# Patient Record
Sex: MALE | Race: BLACK OR AFRICAN AMERICAN | Employment: FULL TIME | URBAN - METROPOLITAN AREA
[De-identification: names, ages, dates, MRNs, and addresses within clinical notes are randomized per-mention and may not be internally consistent; named-entity substitution may affect disease eponyms.]

---

## 2018-07-23 ENCOUNTER — OFFICE VISIT (OUTPATIENT)
Dept: ORTHOPEDIC SURGERY | Age: 63
End: 2018-07-23

## 2018-07-23 VITALS
SYSTOLIC BLOOD PRESSURE: 170 MMHG | DIASTOLIC BLOOD PRESSURE: 103 MMHG | RESPIRATION RATE: 16 BRPM | HEIGHT: 66 IN | OXYGEN SATURATION: 99 % | WEIGHT: 191 LBS | TEMPERATURE: 97.4 F | HEART RATE: 114 BPM | BODY MASS INDEX: 30.7 KG/M2

## 2018-07-23 DIAGNOSIS — M19.079 ARTHRITIS OF METATARSOPHALANGEAL (MTP) JOINT OF GREAT TOE: ICD-10-CM

## 2018-07-23 DIAGNOSIS — M79.672 LEFT FOOT PAIN: ICD-10-CM

## 2018-07-23 DIAGNOSIS — M19.079 ARTHRITIS OF METATARSOPHALANGEAL (MTP) JOINT OF GREAT TOE: Primary | ICD-10-CM

## 2018-07-23 RX ORDER — FAMOTIDINE 40 MG/1
40 TABLET, FILM COATED ORAL DAILY
Qty: 30 TAB | Refills: 0 | Status: SHIPPED | OUTPATIENT
Start: 2018-07-23 | End: 2021-08-20

## 2018-07-23 RX ORDER — MELOXICAM 15 MG/1
15 TABLET ORAL
Qty: 30 TAB | Refills: 0 | Status: SHIPPED | OUTPATIENT
Start: 2018-07-23 | End: 2018-08-15

## 2018-07-23 NOTE — PATIENT INSTRUCTIONS
Please follow up in 4 weeks. You are advised to contact us if your condition worsens. Encourage Supportive Shoes: HOKA ONE, MBT, or Sketchers with Rockerbottom. Running Etc: Amari, [address] [facility] or [employer] Search Cartiva implants for information. The provider has ordered a custom brace/orthotic for you. This will be customized and made for you by an outside facility. Please contact the orthotist at one of the below offices for your custom fitting and follow up in the office with the doctor or his physicians assistant 3 weeks after you have been wearing the brace. Kaylah Bob at [facility]:  
 
[address] [place] [address] [facility] Phone: [phone] Or  
 
Perfecto Srinivasan 41. Renetta [address] [place] [address] Phone: [phone] Foot Pain: Care Instructions Your Care Instructions Foot injuries that cause pain and swelling are fairly common. Almost all sports or home repair projects can cause a misstep that ends up as foot pain. Normal wear and tear, especially as you get older, also can cause foot pain. Most minor foot injuries will heal on their own, and home treatment is usually all you need to do. If you have a severe injury, you may need tests and treatment. Follow-up care is a key part of your treatment and safety. Be sure to make and go to all appointments, and call your doctor if you are having problems. It's also a good idea to know your test results and keep a list of the medicines you take. How can you care for yourself at home? · Take pain medicines exactly as directed. ¨ If the doctor gave you a prescription medicine for pain, take it as prescribed. ¨ If you are not taking a prescription pain medicine, ask your doctor if you can take an over-the-counter medicine. · Rest and protect your foot. Take a break from any activity that may cause pain. · Put ice or a cold pack on your foot for 10 to 20 minutes at a time.  Put a thin cloth between the ice and your skin. · Prop up the sore foot on a pillow when you ice it or anytime you sit or lie down during the next 3 days. Try to keep it above the level of your heart. This will help reduce swelling. · Your doctor may recommend that you wrap your foot with an elastic bandage. Keep your foot wrapped for as long as your doctor advises. · If your doctor recommends crutches, use them as directed. · Wear roomy footwear. · As soon as pain and swelling end, begin gentle exercises of your foot. Your doctor can tell you which exercises will help. When should you call for help? Call 911 anytime you think you may need emergency care. For example, call if: 
  · Your foot turns pale, white, blue, or cold.  
 Call your doctor now or seek immediate medical care if: 
  · You cannot move or stand on your foot.  
  · Your foot looks twisted or out of its normal position.  
  · Your foot is not stable when you step down.  
  · You have signs of infection, such as: 
¨ Increased pain, swelling, warmth, or redness. ¨ Red streaks leading from the sore area. ¨ Pus draining from a place on your foot. ¨ A fever.  
  · Your foot is numb or tingly.  
 Watch closely for changes in your health, and be sure to contact your doctor if: 
  · You do not get better as expected.  
  · You have bruises from an injury that last longer than 2 weeks. Where can you learn more? Go to http://brianne-anamaria.info/. Enter R244 in the search box to learn more about \"Foot Pain: Care Instructions. \" Current as of: November 29, 2017 Content Version: 11.7 © 6280-7979 Backblaze. Care instructions adapted under license by [employer] (which disclaims liability or warranty for this information). If you have questions about a medical condition or this instruction, always ask your healthcare professional. [address] any warranty or liability for your use of this information.

## 2018-07-23 NOTE — LETTER
NOTIFICATION RETURN TO WORK / SCHOOL 
 
7/23/2018 11:27 AM 
 
Mr. Deng Velasquez [place] [address] To Whom It May Concern: 
 
Deng Velasquez is currently under the care of [address] [place] [address] Alf Stephensd. Please excuse Mr. Dominic Arias from [employer] falls duty. He is under my care for severe osteoarthritis that is painful while sitting and standing. If there are questions or concerns please have the patient contact our office.  
 
 
 
Sincerely, 
 
 
Leopoldo Jacobs, MD

## 2018-07-23 NOTE — PROGRESS NOTES
AMBULATORY PROGRESS NOTE      Patient: Keren Ahmadi             MRN: [id number]     SSN: [id number] Body mass index is 30.83 kg/(m^2). YOB: 1955     AGE: 66 y.o. EX: male    PCP: None    IMPRESSION/DIAGNOSIS AND TREATMENT PLAN     DIAGNOSES  1. Arthritis of metatarsophalangeal (MTP) joint of great toe    2. Left foot pain        Orders Placed This Encounter    AMB SUPPLY ORDER    [38271] Foot Min 3V    NICOTINE METABOLITE    meloxicam (MOBIC) 15 mg tablet    famotidine (PEPCID) 40 mg tablet      Eduar Dapdevorah oBny understands his diagnoses and the proposed plan. I had a lengthy discussion with him. He has severe OA, left great toe 1st MTP. Treatment options include left great toe 1st MTP arthrodesis, possible Cartiva implant but I am thinking more of a fusion. Additional plans are listed as below. Concern about his smoking, smokes at least 1 pack of cigarettes per day and am concerned about his nicotine levels. I explained that nicotine is a deleterious to bone healing. He understands this. He has poor motion, left great toe 1st MTP. Plan:    1) Encourage Supportive Shoes: HOKA ONE, MBT, or Sketchers with RockerConferensumom. Running Etc: [employer] or [employer], [employer]  2) Lab tests: Nicotine levels. 3) DME Order: Graphite Insert with Yee's extension. (REACH)  4) Mobic 15 m PO every day; 30 tablets, 0 refills. 5) Pepcid 40 m PO every day; 30 tablets, 0 refills. 6) Take OTC Arthritis strength Tylenol as directed. 7) Work Note: Please excuse Mr. Gladys Calloway from [employer] duty. He is under my care for severe osteoarthritis that is painful while sitting and standing. RTO - 4 weeks    HPI AND EXAMINATION     Melvina Lovett IS A 66 y.o. male who presents to my outpatient office complaining of foot pain. Patient reports he has experienced constant left foot pain for several weeks. He had surgery along his left great toe in .  XR imaging has been reviewed with the patient showing he had a Burton procedure on his right great toe. Additionally XR shows severe left great toe OA. He notes there is an area of the medial great toe that is painful and can keep him up throughout the night. Patient denies h/o GI conditions. He is not taking anticoagulation medication. Patient works for [employer]. Patient reports he smokes about a pack per day. Appearance: Alert, well appearing and pleasant patient who is in no distress, oriented to person, place/time, and who follows commands. This patient is accompanied in the       office by his  spouse. Psychiatric: Affect and mood are appropriate. Cardiovascular/Peripheral Vascular: Normal Pulses to each hand and foot  Musculoskeletal: LOCATION:  left GREAT TOE MTP       Integumentary: No rashes, skin patches, wounds, or abrasions to the right or left legs       Warm and normal color. No regions of expressible drainage. Prior scars or incisions present        Well healed, remote, surgical scar along top side of great toe. GREAT TOE JOINT:  Tenderness w/ Forced Extension present, w/ ROM arc, w/ manual joint compression present, Palpable dorsal spur not present,       Gait: antalgic and slow      Tenderness: severe tenderness present,       Motor/Strength/Tone Exam: Diminished flexion/extension strength      Sensory Exam:  Sensation is Intact      Stability Testing: Instability of great toe is not  Present       ROM: GREAT TOE ROM is moderate diminished             ANKLE ROM is Normal,       Contractures: No Achilles or Gastrocnemius Contractures      Calf tenderness: Absent for calf or gastrocnemius muscle regions       Soft, supple, non tender, non taut lower extremity compartment  Alignment:  neutral GREAT TOE   Wounds/Abrasions:  None present  Extremities:   No embolic phenomena to the toes          No significant edema to the foot and or toes.         Lower extremities are warm and appear well perfused    DVT: No evidence of DVT seen on examination at this time    No calf swelling, no tenderness to calf muscles  Lymphatic:  No Evidence of Lymphedema  Vascular: Medial Border of Tibia Region: Edema is not present       Dorsalis Pedis PalpableYES Posterior Tibial  PalpableYES        Varicosities Lower Limbs : to calf NO   Neuro: Negative bilateral Straight leg raise (seated position)    No abnormal hand/wrist, foot/ankle, or facial/neck tremors. CHART REVIEW     Past Medical History:   Diagnosis Date    High cholesterol      Current Outpatient Prescriptions   Medication Sig    meloxicam (MOBIC) 15 mg tablet Take 1 Tab by mouth daily (with breakfast).  famotidine (PEPCID) 40 mg tablet Take 1 Tab by mouth daily. No current facility-administered medications for this visit. Allergies   Allergen Reactions    Aspirin Unknown (comments)    Morphine Hives    Nsaids (Non-Steroidal Anti-Inflammatory Drug) Hives    Pepto-Bismol [Bismuth Subsalicylate] Hives    Prednisone Nausea Only    Vicodin [Hydrocodone-Acetaminophen] Unknown (comments)     No past surgical history on file. Social History     Occupational History    Not on file. Social History Main Topics    Smoking status: Current Every Day Smoker     Packs/day: 0.50    Smokeless tobacco: Never Used    Alcohol use Yes    Drug use: No    Sexual activity: Not on file     No family history on file.     REVIEW OF SYSTEMS : 7/23/2018  ALL BELOW ARE Negative except : SEE HPI        REVIEW OF SYSTEMS : 7/23/2018  ALL BELOW ARE Negative except : SEE HPI     CONSTITUTIONAL: denies chills, fatigue, fever, weight change   PSYCH: denies anxiety, depression, irritability or mood swings   ENT: denies - headaches, hearing change, nasal congestion, oral lesions, or sore throat   HEM/ONC denies - bleeding problems, bruising, pallor or swollen lymph nodes   ENDO: denies hot flashes, polydipsia/polyuria or temperature intolerance   RESP: denies - cough, shortness of breath or wheezing   CV: denies - chest pain, edema or palpitations, ALTMAN  GI: denies - abdominal pain, change in bowel habits, constipation, diarrhea or nausea/vomiting   : denies - dysuria, hematuria, incontinence, pelvic pain   MSK: denies  - See HPI. NEURO: denies - confusion, headaches, seizures or weakness   DERM: denies - dry skin, hair changes, rash or skin lesion changes  VASCULAR: Peripheral Vascular: No calf pain, vascular vein tenderness to calf pain              No calf throbbing, posterior knee throbbing pain     DIAGNOSTIC IMAGING     FOOT X RAYS 3 VIEWS Left   7/23/2018    WEIGHT BEARING    X RAYS AT [facility] [address] [place] [address]  7/23/2018    { Left FOOT:     Bone Spurs No significant bone spurs   Joint and Alignment: severe OA OF THE GREAT TOE MTP   Great toe alignment: Great Toe Alignment Valgus  Soft Tissues No abnormal calcific densities to soft tissues   No ankle joint effusion in lateral projection. Mineralization: suggests Osteopenia      I have personally reviewed the results of the above study. The interpretation of this study is my professional opinion     Written by Gregory Reyes, as dictated by Dr. Marilyn Darnell. I, Marilyn Clemens confirm that all documentation is accurate.

## 2018-07-23 NOTE — PROCEDURES
FOOT X RAYS 3 VIEWS Left   7/23/2018    WEIGHT BEARING    X RAYS AT [address] [place] [address]  7/23/2018    {Left FOOT:     Bone Spurs No significant bone spurs   Joint and Alignment: severe OA OF THE GREAT TOE MTP   Great toe alignment: Great Toe Alignment Valgus  Soft Tissues No abnormal calcific densities to soft tissues   No ankle joint effusion in lateral projection. Mineralization: suggests Osteopenia      I have personally reviewed the results of the above study.  The interpretation of this study is my professional opinion

## 2018-07-23 NOTE — MR AVS SNAPSHOT
[address] [place] [address], [address] [facility] 
[phone] Patient: Aaron Maria MRN: [id number] GHT:[birth year] Visit Information Date & Time Provider Department Dept. Phone Encounter #  
 7/23/2018 10:00 AM Argenis Nuñez, [address] [facility] [address] [facility] [phone] [id number] Upcoming Health Maintenance Date Due Hepatitis C Screening 1955 Pneumococcal 19-64 Medium Risk (1 of 1 - PPSV23) 4/13/1976 DTaP/Tdap/Td series (1 - Tdap) 4/13/1978 FOBT Q 1 YEAR AGE 50-75 4/13/2007 ZOSTER VACCINE AGE 60> 2/13/2017 Influenza Age 5 to Adult 8/1/2018 Allergies as of 7/23/2018  Review Complete On: 7/23/2018 By: Tryon Mems Severity Noted Reaction Type Reactions Aspirin  08/17/2014    Unknown (comments) Morphine  08/17/2014    Hives Nsaids (Non-steroidal Anti-inflammatory Drug)  08/17/2014    Hives Pepto-bismol [Bismuth Subsalicylate]  95/08/8514    Hives Prednisone  08/17/2014    Nausea Only Vicodin [Hydrocodone-acetaminophen]  08/17/2014    Unknown (comments) Current Immunizations  Never Reviewed No immunizations on file. Not reviewed this visit You Were Diagnosed With   
  
 Codes Comments Arthritis of metatarsophalangeal (MTP) joint of great toe    -  Primary ICD-10-CM: M19.079 ICD-9-CM: 716.97 Left foot pain     ICD-10-CM: C13.278 ICD-9-CM: 729.5 Vitals BP Pulse Temp Resp Height(growth percentile) Weight(growth percentile) (!) 170/103 (!) 114 97.4 °F (36.3 °C) (Oral) 16 5' 6\" (1.676 m) 191 lb (86.6 kg) SpO2 BMI Smoking Status 99% 30.83 kg/m2 Current Every Day Smoker Vitals History BMI and BSA Data Body Mass Index Body Surface Area  
 30.83 kg/m 2 2.01 m 2 Preferred Pharmacy Pharmacy Name Phone [facility] [address] [facility], [facility] [phone] Your Updated Medication List  
  
Notice  As of 7/23/2018 11:37 AM  
 You have not been prescribed any medications. We Performed the Following AMB POC XRAY, FOOT; COMPLETE, 3+ VIEW [29773 CPT(R)] AMB SUPPLY ORDER [[id number] Custom] Comments:  
 Graphite Insert with Yee's extension. (BALA) To-Do List   
 07/23/2018 Lab:  NICOTINE METABOLITE Patient Instructions Please follow up in 4 weeks. You are advised to contact us if your condition worsens. Encourage Supportive Shoes: HOKA ONE, MBT, or Sketchers with Rockerbottom. Running Etc: [employer] or [employer] Search Cartiva implants for information. The provider has ordered a custom brace/orthotic for you. This will be customized and made for you by an outside facility. Please contact the orthotist at one of the below offices for your custom fitting and follow up in the office with the doctor or his physicians assistant 3 weeks after you have been wearing the brace. Avinash Payne at [facility]:  
 
[address] [place] [address] Phone: [phone] Or  
 
Perfecto Srinivasan 41. [place] [address] [place] [address] Phone: [phone] Foot Pain: Care Instructions Your Care Instructions Foot injuries that cause pain and swelling are fairly common. Almost all sports or home repair projects can cause a misstep that ends up as foot pain. Normal wear and tear, especially as you get older, also can cause foot pain. Most minor foot injuries will heal on their own, and home treatment is usually all you need to do. If you have a severe injury, you may need tests and treatment. Follow-up care is a key part of your treatment and safety. Be sure to make and go to all appointments, and call your doctor if you are having problems. It's also a good idea to know your test results and keep a list of the medicines you take. How can you care for yourself at home? · Take pain medicines exactly as directed. ¨ If the doctor gave you a prescription medicine for pain, take it as prescribed. ¨ If you are not taking a prescription pain medicine, ask your doctor if you can take an over-the-counter medicine. · Rest and protect your foot. Take a break from any activity that may cause pain. · Put ice or a cold pack on your foot for 10 to 20 minutes at a time. Put a thin cloth between the ice and your skin. · Prop up the sore foot on a pillow when you ice it or anytime you sit or lie down during the next 3 days. Try to keep it above the level of your heart. This will help reduce swelling. · Your doctor may recommend that you wrap your foot with an elastic bandage. Keep your foot wrapped for as long as your doctor advises. · If your doctor recommends crutches, use them as directed. · Wear roomy footwear. · As soon as pain and swelling end, begin gentle exercises of your foot. Your doctor can tell you which exercises will help. When should you call for help? Call 911 anytime you think you may need emergency care. For example, call if: 
  · Your foot turns pale, white, blue, or cold.  
 Call your doctor now or seek immediate medical care if: 
  · You cannot move or stand on your foot.  
  · Your foot looks twisted or out of its normal position.  
  · Your foot is not stable when you step down.  
  · You have signs of infection, such as: 
¨ Increased pain, swelling, warmth, or redness. ¨ Red streaks leading from the sore area. ¨ Pus draining from a place on your foot. ¨ A fever.  
  · Your foot is numb or tingly.  
 Watch closely for changes in your health, and be sure to contact your doctor if: 
  · You do not get better as expected.  
  · You have bruises from an injury that last longer than 2 weeks. Where can you learn more? Go to http://brianne-anamaria.info/. Enter D647 in the search box to learn more about \"Foot Pain: Care Instructions. \" 
 Current as of: November 29, 2017 Content Version: 11.7 © 3065-8248 [employer], iPointer. Care instructions adapted under license by [employer] (which disclaims liability or warranty for this information). If you have questions about a medical condition or this instruction, always ask your healthcare professional. [address] any warranty or liability for your use of this information. Introducing [facility]! Romayne Duster introduces Lagotek patient portal. Now you can access parts of your medical record, email your doctor's office, and request medication refills online. 1. In your internet browser, go to [email or web address] 2. Click on the First Time User? Click Here link in the Sign In box. You will see the New Member Sign Up page. 3. Enter your Lagotek Access Code exactly as it appears below. You will not need to use this code after youve completed the sign-up process. If you do not sign up before the expiration date, you must request a new code. · Lagotek Access Code: [email or web address] Expires: 10/21/2018 11:37 AM 
 
4. Enter the last four digits of your Social Security Number (xxxx) and Date of Birth (mm/dd/yyyy) as indicated and click Submit. You will be taken to the next sign-up page. 5. Create a Lagotek ID. This will be your Lagotek login ID and cannot be changed, so think of one that is secure and easy to remember. 6. Create a Lagotek password. You can change your password at any time. 7. Enter your Password Reset Question and Answer. This can be used at a later time if you forget your password. 8. Enter your e-mail address. You will receive e-mail notification when new information is available in [address] 9. Click Sign Up. You can now view and download portions of your medical record. 10. Click the Download Summary menu link to download a portable copy of your medical information. If you have questions, please visit the Frequently Asked Questions section of the Repliset website. Remember, Private Outlet is NOT to be used for urgent needs. For medical emergencies, dial 911. Now available from your iPhone and Android! Please provide this summary of care documentation to your next provider. Your primary care clinician is listed as NONE. If you have any questions after today's visit, please call [phone].

## 2018-07-23 NOTE — LETTER
NOTIFICATION RETURN TO WORK / SCHOOL 
 
7/23/2018 11:33 AM 
 
Mr. Harvis Meckel [place] [address] To Whom It May Concern: 
 
Harvis Meckel is currently under the care of [address] [place] [address] Alf Barnard. He was seen in our office today, please excuse him for the time he missed from work. If there are questions or concerns please have the patient contact our office.  
 
 
 
Sincerely, 
 
 
Denise Velez MD

## 2018-07-26 ENCOUNTER — TELEPHONE (OUTPATIENT)
Dept: ORTHOPEDIC SURGERY | Age: 63
End: 2018-07-26

## 2018-07-26 NOTE — TELEPHONE ENCOUNTER
Discussed with PA Tauna Snellen, patient instructed to D/C mobic. Instructed to take Benadryl for whelps and \"blotching\"   Patient will go to the ED if he notices SOB, dizziness.

## 2018-07-26 NOTE — TELEPHONE ENCOUNTER
Pt spouse on [employer]aa called states pt was provided Rx for mobic and [facility]. Spouse states pt is allergic to aspirin and mobic has aspirin in it per spouse. Per pt request please call in something different that does not have aspirin. [facility] [address] [facility].     Please confirm with the pt/or spouse cornell when done pt p#[phone]

## 2018-08-13 ENCOUNTER — OFFICE VISIT (OUTPATIENT)
Dept: ORTHOPEDIC SURGERY | Age: 63
End: 2018-08-13

## 2018-08-13 VITALS
RESPIRATION RATE: 16 BRPM | DIASTOLIC BLOOD PRESSURE: 98 MMHG | SYSTOLIC BLOOD PRESSURE: 162 MMHG | TEMPERATURE: 98.7 F | HEART RATE: 111 BPM | HEIGHT: 66 IN | OXYGEN SATURATION: 99 % | BODY MASS INDEX: 30.7 KG/M2 | WEIGHT: 191 LBS

## 2018-08-13 DIAGNOSIS — M19.072 DEGENERATIVE ARTHRITIS OF TOE JOINT, LEFT: Primary | ICD-10-CM

## 2018-08-13 NOTE — PATIENT INSTRUCTIONS
Please follow up in 5 weeks. You are advised to contact us if your condition worsens. Foot Pain: Care Instructions  Your Care Instructions  Foot injuries that cause pain and swelling are fairly common. Almost all sports or home repair projects can cause a misstep that ends up as foot pain. Normal wear and tear, especially as you get older, also can cause foot pain. Most minor foot injuries will heal on their own, and home treatment is usually all you need to do. If you have a severe injury, you may need tests and treatment. Follow-up care is a key part of your treatment and safety. Be sure to make and go to all appointments, and call your doctor if you are having problems. It's also a good idea to know your test results and keep a list of the medicines you take. How can you care for yourself at home? · Take pain medicines exactly as directed. ¨ If the doctor gave you a prescription medicine for pain, take it as prescribed. ¨ If you are not taking a prescription pain medicine, ask your doctor if you can take an over-the-counter medicine. · Rest and protect your foot. Take a break from any activity that may cause pain. · Put ice or a cold pack on your foot for 10 to 20 minutes at a time. Put a thin cloth between the ice and your skin. · Prop up the sore foot on a pillow when you ice it or anytime you sit or lie down during the next 3 days. Try to keep it above the level of your heart. This will help reduce swelling. · Your doctor may recommend that you wrap your foot with an elastic bandage. Keep your foot wrapped for as long as your doctor advises. · If your doctor recommends crutches, use them as directed. · Wear roomy footwear. · As soon as pain and swelling end, begin gentle exercises of your foot. Your doctor can tell you which exercises will help. When should you call for help? Call 911 anytime you think you may need emergency care.  For example, call if:    · Your foot turns pale, white, blue, or cold.    Call your doctor now or seek immediate medical care if:    · You cannot move or stand on your foot.     · Your foot looks twisted or out of its normal position.     · Your foot is not stable when you step down.     · You have signs of infection, such as:  ¨ Increased pain, swelling, warmth, or redness. ¨ Red streaks leading from the sore area. ¨ Pus draining from a place on your foot. ¨ A fever.     · Your foot is numb or tingly.    Watch closely for changes in your health, and be sure to contact your doctor if:    · You do not get better as expected.     · You have bruises from an injury that last longer than 2 weeks. Where can you learn more? Go to http://brianne-anamaria.info/. Enter U481 in the search box to learn more about \"Foot Pain: Care Instructions. \"  Current as of: November 29, 2017  Content Version: 11.7  © 5788-3128 [employer]. Care instructions adapted under license by [employer] (which disclaims liability or warranty for this information). If you have questions about a medical condition or this instruction, always ask your healthcare professional. [place] [address] any warranty or liability for your use of this information.

## 2018-08-13 NOTE — PROGRESS NOTES
AMBULATORY PROGRESS NOTE      Patient: Baldev Mayer             MRN: [id number]     SSN: [id number] Body mass index is 30.83 kg/(m^2). YOB: 1955     AGE: 66 y.o. EX: male    PCP: None    IMPRESSION/DIAGNOSIS AND TREATMENT PLAN     DIAGNOSES  1. 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 Baldev Mayer understands his diagnoses and the proposed plan. Plan:    1) Obtain the graphite insert as directed. 2) Continue activity modification as directed. 3) Alternate Ibuprofen and Tylenol as directed. RTO - 5 weeks    HPI AND EXAMINATION     Melvina Lovett IS A 66 y.o. male who presents to my outpatient office for follow up of arthritis of the MTP joint of the great toe. At last visit, I encouraged using supportive shoes, ordered lab tests, ordered a graphite inserts, prescribed Mobic 15 mg and Pepcid 40 mg, and gave a work note. Since we saw him last, Mr. Mercedes Keen reports he is scheduled to obtain the graphite insert on 8/15. Today, he notes the pain along his MTP has subsided, but he has some discomfort along the IP joint. He has used a silicone sleeve on his left great toe which has helped. He continues to be involved in aqua-therapy which has helped. Patient reports Ibuprofen has provided relief. Patient works for [employer]. Patient reports he smokes about a pack per day.     Appearance: Alert, well appearing and pleasant patient who is in no distress, oriented to person, place/time, and who follows commands. This patient is accompanied in the       office by his  spouse. Psychiatric: Affect and mood are appropriate. Cardiovascular/Peripheral Vascular: Normal Pulses to each hand and foot  Musculoskeletal: LOCATION:  left GREAT TOE MTP       Integumentary: No rashes, skin patches, wounds, or abrasions to the right or left legs                                   Warm and normal color.  No regions of expressible drainage. Prior scars or incisions present                                    Well healed, remote, surgical scar along top side of great toe. GREAT TOE JOINT:  Tenderness w/ Forced Extension present, w/ ROM arc, w/ manual joint compression present, Palpable dorsal spur not present,       Gait: antalgic and slow      Tenderness: severe tenderness present,       Motor/Strength/Tone Exam: Diminished flexion/extension strength      Sensory Exam:  Sensation is Intact      Stability Testing: Instability of great toe is not  Present       ROM: GREAT TOE ROM is moderate diminished             ANKLE ROM is Normal,       Contractures: No Achilles or Gastrocnemius Contractures      Calf tenderness: Absent for calf or gastrocnemius muscle regions       Soft, supple, non tender, non taut lower extremity compartment  Alignment:  neutral GREAT TOE   Wounds/Abrasions:  None present  Extremities:   No embolic phenomena to the toes                                               No significant edema to the foot and or toes. Lower extremities are warm and appear well perfused                                              DVT: No evidence of DVT seen on examination at this time                                              No calf swelling, no tenderness to calf muscles  Lymphatic:  No Evidence of Lymphedema  Vascular: Medial Border of Tibia Region: Edema is not present       Dorsalis Pedis PalpableYES Posterior Tibial  PalpableYES                             Varicosities Lower Limbs : to calf NO   Neuro: Negative bilateral Straight leg raise (seated position)                         No abnormal hand/wrist, foot/ankle, or facial/neck tremors.     CHART REVIEW     Past Medical History:   Diagnosis Date    High cholesterol      Current Outpatient Prescriptions   Medication Sig    meloxicam (MOBIC) 15 mg tablet Take 1 Tab by mouth daily (with breakfast).  famotidine (PEPCID) 40 mg tablet Take 1 Tab by mouth daily. No current facility-administered medications for this visit. Allergies   Allergen Reactions    Aspirin Unknown (comments)    Morphine Hives    Nsaids (Non-Steroidal Anti-Inflammatory Drug) Hives    Pepto-Bismol [Bismuth Subsalicylate] Hives    Prednisone Nausea Only    Vicodin [Hydrocodone-Acetaminophen] Unknown (comments)     No past surgical history on file. Social History     Occupational History    Not on file. Social History Main Topics    Smoking status: Current Every Day Smoker     Packs/day: 0.50    Smokeless tobacco: Never Used    Alcohol use Yes    Drug use: No    Sexual activity: Not on file     No family history on file. REVIEW OF SYSTEMS : 8/13/2018  ALL BELOW ARE Negative except : SEE HPI       Constitutional: Negative for fever, chills and weight loss. Neg Weigh Loss  Cardiovascular: Negative for chest pain, claudication and leg swelling. SOB, ALTMAN   Gastrointestinal: Negative for  pain, N/V/D/C, Blood in stool or urine,dysuria, hematuria,        Incontinence, pelvic pain  Musculoskeletal: see HPI. Neurological: Negative for dizziness and weakness. Negative for headaches,Visual Changes, Confusion, Seizures,   Psychiatric/Behavioral: Negative for depression, memory loss and substance abuse. Extremities:  Negative for  hair changes, rash or skin lesion changes. Hematologic: Negative for Bleeding problems, bruising, pallor or swollen lymph nodes. Peripheral Vascular: No calf pain, vascular vein tenderness to calf pain              No calf throbbing, posterior knee throbbing pain    DIAGNOSTIC IMAGING     No notes on file    Written by Maria Ines Anderson, as dictated by Dr. Charline Garza. Dr. JAY Lura Glazier confirm that all documentation is accurate.

## 2018-08-14 ENCOUNTER — TELEPHONE (OUTPATIENT)
Dept: ORTHOPEDIC SURGERY | Age: 63
End: 2018-08-14

## 2018-08-14 NOTE — TELEPHONE ENCOUNTER
Patient has a reported allergy on file to NSAIDS and ASA. Please advise.     Triston Vega PA-C  8/14/2018   4:40 PM

## 2018-08-14 NOTE — TELEPHONE ENCOUNTER
Pt states dr Rola Hannon was suppose to call in ibuprofen 600    please call in to his pharmacy on file    [facility] [address] [facility]#[id number]  Pt X#[phone]

## 2018-08-15 ENCOUNTER — TELEPHONE (OUTPATIENT)
Dept: ORTHOPEDIC SURGERY | Age: 63
End: 2018-08-15

## 2018-08-15 RX ORDER — IBUPROFEN 600 MG/1
600 TABLET ORAL
Qty: 90 TAB | Refills: 2 | Status: SHIPPED | OUTPATIENT
Start: 2018-08-15 | End: 2021-08-20

## 2018-08-15 NOTE — TELEPHONE ENCOUNTER
Called patient back and explained that he needs to go get meds OTC or from his PCP to avoid any confusion. Patient has stated that he could take motrin and then turned around and stated that he could not take it. He says that motrin breaks him out, but not ibuprofen.

## 2018-08-15 NOTE — TELEPHONE ENCOUNTER
Patient called regarding rx for Ibuprofen (Motrin) 600 - says he requested Ibuprofen, but cannot take Motrin as it contains aspirin. Please issue another prescription that does not contain aspirin and advise patient when complete at [phone].

## 2018-08-15 NOTE — TELEPHONE ENCOUNTER
Patient called in states that he would like to know why he needs to go to his PCP for a rx that does not contain motrin. He states that he can take the Ibuprofen but not that contains nsaid as he has reactions with that. Patient seemed frustrated and would like a call back from Dr. Hank Garza at [phone].

## 2018-08-15 NOTE — TELEPHONE ENCOUNTER
Rx for Motrin has been e-scribed to patient's pharmacy.     Cristian Quick PA-C  8/15/2018   10:22 AM

## 2018-09-17 ENCOUNTER — OFFICE VISIT (OUTPATIENT)
Dept: ORTHOPEDIC SURGERY | Age: 63
End: 2018-09-17

## 2018-09-17 VITALS
HEIGHT: 66 IN | HEART RATE: 104 BPM | OXYGEN SATURATION: 98 % | WEIGHT: 191 LBS | BODY MASS INDEX: 30.7 KG/M2 | DIASTOLIC BLOOD PRESSURE: 87 MMHG | SYSTOLIC BLOOD PRESSURE: 122 MMHG | RESPIRATION RATE: 16 BRPM | TEMPERATURE: 98.2 F

## 2020-03-04 ENCOUNTER — EMPLOYEE WELLNESS (OUTPATIENT)
Dept: OTHER | Facility: CLINIC | Age: 65
End: 2020-03-04

## 2020-03-11 LAB
CHOLEST SERPL-MCNC: 172 MG/DL
COTININE SERPL-MCNC: 62.6 NG/ML
GLUCOSE SERPL-MCNC: 108 MG/DL (ref 74–99)
HDLC SERPL-MCNC: 52 MG/DL (ref 40–60)
LDLC SERPL CALC-MCNC: 99 MG/DL (ref 0–100)
NICOTINE SERPL-MCNC: 1.9 NG/ML
TRIGL SERPL-MCNC: 105 MG/DL (ref ?–150)

## 2021-08-20 ENCOUNTER — OFFICE VISIT (OUTPATIENT)
Dept: CARDIOLOGY CLINIC | Age: 66
End: 2021-08-20
Payer: COMMERCIAL

## 2021-08-20 VITALS
HEART RATE: 113 BPM | HEIGHT: 66 IN | DIASTOLIC BLOOD PRESSURE: 100 MMHG | WEIGHT: 192 LBS | SYSTOLIC BLOOD PRESSURE: 160 MMHG | OXYGEN SATURATION: 98 % | BODY MASS INDEX: 30.86 KG/M2

## 2021-08-20 DIAGNOSIS — R07.9 CHEST PAIN, UNSPECIFIED TYPE: ICD-10-CM

## 2021-08-20 DIAGNOSIS — R00.0 TACHYCARDIA: ICD-10-CM

## 2021-08-20 DIAGNOSIS — R94.31 ABNORMAL EKG: Primary | ICD-10-CM

## 2021-08-20 PROCEDURE — 93000 ELECTROCARDIOGRAM COMPLETE: CPT | Performed by: INTERNAL MEDICINE

## 2021-08-20 PROCEDURE — 99204 OFFICE O/P NEW MOD 45 MIN: CPT | Performed by: INTERNAL MEDICINE

## 2021-08-20 RX ORDER — AMLODIPINE BESYLATE 5 MG/1
TABLET ORAL
COMMUNITY
Start: 2021-08-06 | End: 2021-09-14 | Stop reason: SDUPTHER

## 2021-08-20 NOTE — PROGRESS NOTES
Bryanna Colindres    HTN, tachycardia    HPI    Bryanna Colindres is a 61 y.o. AAM with no known cardiac disease here for initial cardiac evaluation of hypertension tachycardia. As you know patient had an episode of diverticulitis and he says he went to the hospital and was told his blood pressure was high. He was started on hydrochlorothiazide and said he did not feel well on that medicine and he believed it was causing him more issues so that was stopped and he was then put on amlodipine. He says at home now his blood pressures are fine overall is feeling better but his heart rate is still fast.  Not really complaining of chest pain palpitations but did say that he can feel \"gas\" almost daily in his chest but that it goes away when he belches. Beyond blood work and EKGs he has not had any further cardiac testing. I personally obtained and reviewed these EKGs we compare them together today they really are just sinus tachycardia with somewhat poor R wave progression. There are Q waves in 3 and aVF but this also looks like a lead placement issue as the prior one did not have that. Past Medical History:   Diagnosis Date    High cholesterol        No past surgical history on file.     Current Outpatient Medications   Medication Sig Dispense Refill    amLODIPine (NORVASC) 5 mg tablet          Allergies   Allergen Reactions    Aspirin Unknown (comments)    Morphine Hives    Nsaids (Non-Steroidal Anti-Inflammatory Drug) Hives    Pepto-Bismol [Bismuth Subsalicylate] Hives    Prednisone Nausea Only    Vicodin [Hydrocodone-Acetaminophen] Unknown (comments)       Social History     Socioeconomic History    Marital status:      Spouse name: Not on file    Number of children: Not on file    Years of education: Not on file    Highest education level: Not on file   Occupational History    Not on file   Tobacco Use    Smoking status: Current Every Day Smoker     Packs/day: 0.50    Smokeless tobacco: Never Used   Substance and Sexual Activity    Alcohol use: Yes    Drug use: No    Sexual activity: Not on file   Other Topics Concern    Not on file   Social History Narrative    Not on file     Social Determinants of Health     Financial Resource Strain:     Difficulty of Paying Living Expenses:    Food Insecurity:     Worried About Running Out of Food in the Last Year:     [address] in the Last Year:    Transportation Needs:     Lack of Transportation (Medical):  Lack of Transportation (Non-Medical):    Physical Activity:     Days of Exercise per Week:     Minutes of Exercise per Session:    Stress:     Feeling of Stress :    Social Connections:     Frequency of Communication with Friends and Family:     Frequency of Social Gatherings with Friends and Family:     Attends Hindu Services:     Active Member of Clubs or Organizations:     Attends Club or Organization Meetings:     Marital Status:    Intimate Partner Violence:     Fear of Current or Ex-Partner:     Emotionally Abused:     Physically Abused:     Sexually Abused:     they have a pool    FH: neg SCD    Review of Systems    14 pt Review of Systems is negative unless otherwise mentioned in the HPI.     Wt Readings from Last 3 Encounters:   08/20/21 87.1 kg (192 lb)   09/17/18 86.6 kg (191 lb)   08/13/18 86.6 kg (191 lb)     Temp Readings from Last 3 Encounters:   09/17/18 98.2 °F (36.8 °C) (Oral)   08/13/18 98.7 °F (37.1 °C) (Oral)   07/23/18 97.4 °F (36.3 °C) (Oral)     BP Readings from Last 3 Encounters:   08/20/21 (!) 160/100   09/17/18 122/87   08/13/18 (!) 162/98     Pulse Readings from Last 3 Encounters:   08/20/21 (!) 113   09/17/18 (!) 104   08/13/18 (!) 111       Physical Exam:    Visit Vitals  BP (!) 160/100 (BP 1 Location: Left upper arm, BP Patient Position: Sitting, BP Cuff Size: Adult)   Pulse (!) 113   Ht 5' 6\" (1.676 m)   Wt 87.1 kg (192 lb)   SpO2 98%   BMI 30.99 kg/m²      Physical Exam  HENT: Head: Normocephalic and atraumatic. Eyes:      General: No scleral icterus. Pupils: Pupils are equal, round, and reactive to light. Cardiovascular:      Rate and Rhythm: Normal rate and regular rhythm. Heart sounds: Normal heart sounds. No murmur heard. No friction rub. No gallop. Pulmonary:      Effort: Pulmonary effort is normal. No respiratory distress. Breath sounds: Normal breath sounds. No wheezing or rales. Chest:      Chest wall: No tenderness. Abdominal:      General: Bowel sounds are normal.      Palpations: Abdomen is soft. Skin:     General: Skin is warm and dry. Findings: No rash. Neurological:      Mental Status: He is alert and oriented to person, place, and time. EKG today shows: Sinus tachycardia, poor R wave progression, Q waves inferior leads that are different from prior. Impression and Plan:  Keren Abler is a 61 y.o. with:    Atypical CP, doubt ACS, he is very certain its gas and resolves with belching  Sinus tachycardia   S/p recent diverticulitis  HTN. Only on Norvasc  Tobacco abuse    Echocardiogram, will call with results  Doubt ACS as he can swim in his pool etc and not have symptoms  RTC 6 months, if still tachy can consider changing/ adding BB    Thank you for allowing me to participate in the care of your patient, please do not hesitate to call with questions or concerns. Follow-up and Dispositions    · Return in about 6 months (around 2/20/2022).      [facility] DO

## 2021-09-14 RX ORDER — AMLODIPINE BESYLATE 5 MG/1
5 TABLET ORAL DAILY
Qty: 30 TABLET | Refills: 5 | Status: SHIPPED | OUTPATIENT
Start: 2021-09-14 | End: 2022-04-07 | Stop reason: SDUPTHER

## 2021-10-15 ENCOUNTER — TELEPHONE (OUTPATIENT)
Dept: CARDIOLOGY CLINIC | Age: 66
End: 2021-10-15

## 2021-10-15 NOTE — TELEPHONE ENCOUNTER
----- Message from Leighann Boudreaux DO sent at 10/12/2021  2:09 PM EDT -----  His EF is low normal range- I would call it normal  ----- Message -----  From: Sarthak Mccoy LPN  Sent: 71/8/9141   5:25 PM EDT  To: Leighann Boudreaux DO    Per your last note\" Valiant Ganser is a 61 y.o. with:     Atypical CP, doubt ACS, he is very certain its gas and resolves with belching  Sinus tachycardia   S/p recent diverticulitis  HTN.  Only on Norvasc  Tobacco abuse     Echocardiogram, will call with results  Doubt ACS as he can swim in his pool etc and not have symptoms  RTC 6 months, if still tachy can consider changing/ adding BB

## 2022-04-07 RX ORDER — AMLODIPINE BESYLATE 5 MG/1
5 TABLET ORAL DAILY
Qty: 30 TABLET | Refills: 5 | Status: SHIPPED | OUTPATIENT
Start: 2022-04-07 | End: 2022-10-05

## 2022-05-04 ENCOUNTER — OFFICE VISIT (OUTPATIENT)
Dept: CARDIOLOGY CLINIC | Age: 67
End: 2022-05-04
Payer: COMMERCIAL

## 2022-05-04 VITALS
HEIGHT: 66 IN | HEART RATE: 99 BPM | WEIGHT: 200 LBS | BODY MASS INDEX: 32.14 KG/M2 | SYSTOLIC BLOOD PRESSURE: 138 MMHG | OXYGEN SATURATION: 99 % | DIASTOLIC BLOOD PRESSURE: 84 MMHG

## 2022-05-04 DIAGNOSIS — I10 HTN, GOAL BELOW 140/90: ICD-10-CM

## 2022-05-04 DIAGNOSIS — R00.0 TACHYCARDIA: Primary | ICD-10-CM

## 2022-05-04 PROCEDURE — 99215 OFFICE O/P EST HI 40 MIN: CPT | Performed by: INTERNAL MEDICINE

## 2022-05-04 NOTE — PROGRESS NOTES
Rashida Matthew    HTN, tachycardia    HPI    Rashida Matthew is a 74 y.o. AAM with no known cardiac disease here for initial cardiac evaluation of hypertension tachycardia. As you know patient had an episode of diverticulitis and he says he went to the hospital and was told his blood pressure was high. He was started on hydrochlorothiazide and said he did not feel well on that medicine and he believed it was causing him more issues so that was stopped and he was then put on amlodipine. He says at home now his blood pressures are fine overall is feeling better but his heart rate is still fast.  Not really complaining of chest pain palpitations but did say that he can feel \"gas\" almost daily in his chest but that it goes away when he belches. Beyond blood work and EKGs he has not had any further cardiac testing. I personally obtained and reviewed these EKGs we compare them together today they really are just sinus tachycardia with somewhat poor R wave progression. There are Q waves in 3 and aVF but this also looks like a lead placement issue as the prior one did not have that. Past Medical History:   Diagnosis Date    High cholesterol        History reviewed. No pertinent surgical history. Current Outpatient Medications   Medication Sig Dispense Refill    amLODIPine (NORVASC) 5 mg tablet Take 1 Tablet by mouth daily.  30 Tablet 5       Allergies   Allergen Reactions    Aspirin Unknown (comments)    Morphine Hives    Nsaids (Non-Steroidal Anti-Inflammatory Drug) Hives    Pepto-Bismol [Bismuth Subsalicylate] Hives    Prednisone Nausea Only    Vicodin [Hydrocodone-Acetaminophen] Unknown (comments)       Social History     Socioeconomic History    Marital status:      Spouse name: Not on file    Number of children: Not on file    Years of education: Not on file    Highest education level: Not on file   Occupational History    Not on file   Tobacco Use    Smoking status: Current Every Day Smoker     Packs/day: 0.50    Smokeless tobacco: Never Used   Vaping Use    Vaping Use: Never used   Substance and Sexual Activity    Alcohol use: Yes    Drug use: No    Sexual activity: Not Currently   Other Topics Concern    Not on file   Social History Narrative    Not on file     Social Determinants of Health     Financial Resource Strain:     Difficulty of Paying Living Expenses: Not on file   Food Insecurity:     Worried About Running Out of Food in the Last Year: Not on file    [place] of Food in the Last Year: Not on file   Transportation Needs:     Lack of Transportation (Medical): Not on file    Lack of Transportation (Non-Medical): Not on file   Physical Activity:     Days of Exercise per Week: Not on file    Minutes of Exercise per Session: Not on file   Stress:     Feeling of Stress : Not on file   Social Connections:     Frequency of Communication with Friends and Family: Not on file    Frequency of Social Gatherings with Friends and Family: Not on file    Attends Anabaptist Services: Not on file    Active Member of [address] [place] [address] or Organizations: Not on file    Attends Club or Organization Meetings: Not on file    Marital Status: Not on file   Intimate Partner Violence:     Fear of Current or Ex-Partner: Not on file    Emotionally Abused: Not on file    Physically Abused: Not on file    Sexually Abused: Not on file   Housing Stability:     Unable to Pay for Housing in the Last Year: Not on file    Number of [place] in the Last Year: Not on file    Unstable Housing in the Last Year: Not on file    they have a pool    FH: neg SCD    Review of Systems    14 pt Review of Systems is negative unless otherwise mentioned in the HPI.     Wt Readings from Last 3 Encounters:   05/04/22 90.7 kg (200 lb)   10/08/21 87.1 kg (192 lb)   08/20/21 87.1 kg (192 lb)     Temp Readings from Last 3 Encounters:   09/17/18 98.2 °F (36.8 °C) (Oral)   08/13/18 98.7 °F (37.1 °C) (Oral)   07/23/18 97.4 °F (36.3 °C) (Oral)     BP Readings from Last 3 Encounters:   05/04/22 138/84   10/08/21 (!) 160/100   08/20/21 (!) 160/100     Pulse Readings from Last 3 Encounters:   05/04/22 99   08/20/21 (!) 113   09/17/18 (!) 104       Physical Exam:    Visit Vitals  /84 (BP 1 Location: Right arm, BP Patient Position: Sitting, BP Cuff Size: Large adult)   Pulse 99   Ht 5' 6\" (1.676 m)   Wt 90.7 kg (200 lb)   SpO2 99%   BMI 32.28 kg/m²      Physical Exam  HENT:      Head: Normocephalic and atraumatic. Eyes:      General: No scleral icterus. Pupils: Pupils are equal, round, and reactive to light. Cardiovascular:      Rate and Rhythm: Normal rate and regular rhythm. Heart sounds: Normal heart sounds. No murmur heard. No friction rub. No gallop. Pulmonary:      Effort: Pulmonary effort is normal. No respiratory distress. Breath sounds: Normal breath sounds. No wheezing or rales. Chest:      Chest wall: No tenderness. Abdominal:      General: Bowel sounds are normal.      Palpations: Abdomen is soft. Skin:     General: Skin is warm and dry. Findings: No rash. Neurological:      Mental Status: He is alert and oriented to person, place, and time. EKG today shows: Sinus tachycardia, poor R wave progression, Q waves inferior leads that are different from prior. Lab Results   Component Value Date/Time    Sodium 135 (L) 08/17/2014 03:00 PM    Potassium 3.7 08/17/2014 03:00 PM    Chloride 101 08/17/2014 03:00 PM    CO2 22 08/17/2014 03:00 PM    Anion gap 12 08/17/2014 03:00 PM    Glucose 108 (H) 03/04/2020 09:08 AM    BUN 14 08/17/2014 03:00 PM    Creatinine 1.20 08/17/2014 03:00 PM    BUN/Creatinine ratio 12 08/17/2014 03:00 PM    GFR est AA >60 08/17/2014 03:00 PM    GFR est non-AA >60 08/17/2014 03:00 PM    Calcium 8.9 08/17/2014 03:00 PM    Bilirubin, total 0.5 08/17/2014 03:00 PM    Alk.  phosphatase 111 08/17/2014 03:00 PM    Protein, total 6.9 08/17/2014 03:00 PM    Albumin 2.9 (L) 08/17/2014 03:00 PM    Globulin 4.0 08/17/2014 03:00 PM    A-G Ratio 0.7 (L) 08/17/2014 03:00 PM    ALT (SGPT) 81 (H) 08/17/2014 03:00 PM    AST (SGOT) 136 (H) 08/17/2014 03:00 PM     Lab Results   Component Value Date/Time    WBC 5.7 08/17/2014 03:00 PM    HGB 11.2 (L) 08/17/2014 03:00 PM    HCT 33.5 (L) 08/17/2014 03:00 PM    PLATELET 528 (L) 98/01/7767 03:00 PM    MCV 85.0 08/17/2014 03:00 PM     No results found for: HBA1C, [id number], [id number]      Impression and Plan:  Vasquez Zuñiga is a 74 y.o. with:    Atypical CP, doubt ACS, he is very certain its gas and resolves with belching  Sinus tachycardia with low normal EF  S/p recent diverticulitis  HTN. Only on Norvasc  Tobacco abuse, stopped 1 yr ago! (after 39 yrs)    No further testing, no CP etc  He will have to see me yearly for HTN mgt  I urged him to get a PCP  Encouraged continued tobacco cessation! 45 mins  Thank you for allowing me to participate in the care of your patient, please do not hesitate to call with questions or concerns.     [address],    Adis Page, DO

## 2022-05-04 NOTE — PROGRESS NOTES
Rocio Coker presents today for   Chief Complaint   Patient presents with    Follow-up     overdue follow up by 2 months        Rocio Coker preferred language for health care discussion is english/other. Is someone accompanying this pt? no    Is the patient using any DME equipment during [address]? no    Depression Screening:  3 most recent PHQ Screens 5/4/2022   Little interest or pleasure in doing things Not at all   Feeling down, depressed, irritable, or hopeless Not at all   Total Score PHQ 2 0       Learning Assessment:  Learning Assessment 5/4/2022   PRIMARY LEARNER Patient   PRIMARY LANGUAGE ENGLISH   LEARNER PREFERENCE PRIMARY DEMONSTRATION   ANSWERED BY patient   RELATIONSHIP SELF       Abuse Screening:  Abuse Screening Questionnaire 5/4/2022   Do you ever feel afraid of your partner? N   Are you in a relationship with someone who physically or mentally threatens you? N   Is it safe for you to go home? Y       Fall Risk  Fall Risk Assessment, last 12 mths 5/4/2022   Able to walk? Yes   Fall in past 12 months? 0   Do you feel unsteady? 0   Are you worried about falling 0           Pt currently taking Anticoagulant therapy? no    Pt currently taking Antiplatelet therapy ? no      Coordination of Care:  1. Have you been to the ER, urgent care clinic since your last visit? Hospitalized since your last visit? no    2. Have you seen or consulted any other health care providers outside of the [address] [place] [address] since your last visit? Include any pap smears or colon screening.  no

## 2022-10-05 RX ORDER — AMLODIPINE BESYLATE 5 MG/1
TABLET ORAL
Qty: 30 TABLET | Refills: 5 | Status: SHIPPED | OUTPATIENT
Start: 2022-10-05

## 2023-03-13 RX ORDER — AMLODIPINE BESYLATE 5 MG/1
TABLET ORAL
Qty: 30 TABLET | Refills: 6 | Status: SHIPPED | OUTPATIENT
Start: 2023-03-13

## 2023-05-04 ENCOUNTER — OFFICE VISIT (OUTPATIENT)
Age: 68
End: 2023-05-04
Payer: COMMERCIAL

## 2023-05-04 VITALS
BODY MASS INDEX: 32.95 KG/M2 | HEART RATE: 102 BPM | HEIGHT: 66 IN | SYSTOLIC BLOOD PRESSURE: 162 MMHG | OXYGEN SATURATION: 97 % | WEIGHT: 205 LBS | DIASTOLIC BLOOD PRESSURE: 96 MMHG

## 2023-05-04 DIAGNOSIS — R07.9 CHEST PAIN, UNSPECIFIED TYPE: ICD-10-CM

## 2023-05-04 DIAGNOSIS — I10 ESSENTIAL (PRIMARY) HYPERTENSION: ICD-10-CM

## 2023-05-04 DIAGNOSIS — R00.0 TACHYCARDIA, UNSPECIFIED: Primary | ICD-10-CM

## 2023-05-04 PROBLEM — D64.9 NORMOCYTIC NORMOCHROMIC ANEMIA: Status: ACTIVE | Noted: 2023-05-04

## 2023-05-04 PROCEDURE — 99214 OFFICE O/P EST MOD 30 MIN: CPT | Performed by: INTERNAL MEDICINE

## 2023-05-04 PROCEDURE — 93000 ELECTROCARDIOGRAM COMPLETE: CPT | Performed by: INTERNAL MEDICINE

## 2023-05-04 PROCEDURE — 3080F DIAST BP >= 90 MM HG: CPT | Performed by: INTERNAL MEDICINE

## 2023-05-04 PROCEDURE — 3077F SYST BP >= 140 MM HG: CPT | Performed by: INTERNAL MEDICINE

## 2023-05-04 PROCEDURE — 1123F ACP DISCUSS/DSCN MKR DOCD: CPT | Performed by: INTERNAL MEDICINE

## 2023-05-04 ASSESSMENT — PATIENT HEALTH QUESTIONNAIRE - PHQ9
SUM OF ALL RESPONSES TO PHQ QUESTIONS 1-9: 0
SUM OF ALL RESPONSES TO PHQ QUESTIONS 1-9: 0
SUM OF ALL RESPONSES TO PHQ9 QUESTIONS 1 & 2: 0
2. FEELING DOWN, DEPRESSED OR HOPELESS: 0
SUM OF ALL RESPONSES TO PHQ QUESTIONS 1-9: 0
SUM OF ALL RESPONSES TO PHQ QUESTIONS 1-9: 0
1. LITTLE INTEREST OR PLEASURE IN DOING THINGS: 0

## 2023-05-04 NOTE — PROGRESS NOTES
Guerrero Thompson presents today for   Chief Complaint   Patient presents with    Follow-up     1 year follow up       Guerrero Thompson preferred language for health care discussion is english/other. Is someone accompanying this pt? no    Is the patient using any DME equipment during OV? no    Depression Screening:  Depression: Not at risk    PHQ-2 Score: 0        Learning Assessment:  Who is the primary learner? Patient    What is the preferred language for health care of the primary learner? ENGLISH    How does the primary learner prefer to learn new concepts? DEMONSTRATION    Answered By patient    Relationship to Learner SELF           Pt currently taking Anticoagulant therapy? no    Pt currently taking Antiplatelet therapy ? no      Coordination of Care:  1. Have you been to the ER, urgent care clinic since your last visit? Hospitalized since your last visit? no    2. Have you seen or consulted any other health care providers outside of the [address] [place] [address] since your last visit? Include any pap smears or colon screening.  no

## 2023-05-04 NOTE — PROGRESS NOTES
Karon Dunham    HTN, tachycardia    HPI    Karon Dunham is a 74 y.o. AAM with no known cardiac disease here for initial cardiac evaluation of hypertension tachycardia. As you know patient had an episode of diverticulitis and he says he went to the hospital and was told his blood pressure was high. He was started on hydrochlorothiazide and said he did not feel well on that medicine and he believed it was causing him more issues so that was stopped and he was then put on amlodipine. He says at home now his blood pressures are fine overall is feeling better but his heart rate is still fast.  Not really complaining of chest pain palpitations but did say that he can feel \"gas\" almost daily in his chest but that it goes away when he belches. Beyond blood work and EKGs he has not had any further cardiac testing. I personally obtained and reviewed these EKGs we compare them together today they really are just sinus tachycardia with somewhat poor R wave progression. There are Q waves in 3 and aVF but this also looks like a lead placement issue as the prior one did not have that. Past Medical History:   Diagnosis Date    High cholesterol        History reviewed. No pertinent surgical history. Current Outpatient Medications   Medication Sig Dispense Refill    amLODIPine (NORVASC) 5 mg tablet Take 1 Tablet by mouth daily.  30 Tablet 5       Allergies   Allergen Reactions    Aspirin Unknown (comments)    Morphine Hives    Nsaids (Non-Steroidal Anti-Inflammatory Drug) Hives    Pepto-Bismol [Bismuth Subsalicylate] Hives    Prednisone Nausea Only    Vicodin [Hydrocodone-Acetaminophen] Unknown (comments)       Social History     Socioeconomic History    Marital status:      Spouse name: Not on file    Number of children: Not on file    Years of education: Not on file    Highest education level: Not on file   Occupational History    Not on file   Tobacco Use    Smoking status: Current Every Day Smoker

## 2023-09-29 RX ORDER — AMLODIPINE BESYLATE 5 MG/1
TABLET ORAL
Qty: 30 TABLET | Refills: 6 | Status: SHIPPED | OUTPATIENT
Start: 2023-09-29

## 2024-03-27 ENCOUNTER — OFFICE VISIT (OUTPATIENT)
Age: 69
End: 2024-03-27
Payer: COMMERCIAL

## 2024-03-27 DIAGNOSIS — M19.072 PRIMARY OSTEOARTHRITIS OF LEFT FOOT: Primary | ICD-10-CM

## 2024-03-27 PROCEDURE — 73630 X-RAY EXAM OF FOOT: CPT | Performed by: ORTHOPAEDIC SURGERY

## 2024-03-27 PROCEDURE — 1123F ACP DISCUSS/DSCN MKR DOCD: CPT | Performed by: ORTHOPAEDIC SURGERY

## 2024-03-27 PROCEDURE — 99204 OFFICE O/P NEW MOD 45 MIN: CPT | Performed by: ORTHOPAEDIC SURGERY

## 2024-03-27 RX ORDER — METHYLPREDNISOLONE 4 MG/1
TABLET ORAL
Qty: 1 KIT | Refills: 0 | Status: SHIPPED | OUTPATIENT
Start: 2024-03-27 | End: 2024-04-02

## 2024-03-27 RX ORDER — FAMOTIDINE 20 MG/1
20 TABLET, FILM COATED ORAL 2 TIMES DAILY
Qty: 60 TABLET | Refills: 3 | Status: SHIPPED | OUTPATIENT
Start: 2024-03-27

## 2024-03-27 NOTE — PROGRESS NOTES
otherwise healthy. He had a prior left bunion procedure over 10 years ago.    Patient is employed at: Unknown    There were no vitals taken for this visit.    Appearance: Alert, well appearing and pleasant patient who is in no distress, oriented to person, place/time, and who follows commands. Normal dress/motor activity/thought processes/memory. This patient presents in the examination room by himself. Patient arrives to office via: without assistive device. Footwear: Crocs sandals with OTC digital bunion sleeve pad on the left great toe.     Psychiatric:  Normal Affect/mood.  Judgement, behavior, and conduct are appropriate. Speech normal in context and clarity, memory intact grossly, no involuntary movements - tremors.   H EENT (2): Head normocephalic & atraumatic.  Eye: pupils are round// EOM are intact // Neck: ROM WNL  // Hearings Intact  Respiratory: Breathing non labored     LEFT ANKLE/FOOT    Gait:  normal  Tenderness: mild to left great toe joint   Cutaneous: apparent inflammation to the great toe  Joint Motion: great toe MTP joint can be manipulated to neutral with very little flexion and about 20 degrees of dorsiflexion, functional motion at the IP joint   Joint / Tendon Stability:  No Ankle or Subtalar instability or joint laxity.                       No peroneal sublux ability or dislocation  Alignment: neutral Hindfoot, left bunion   Neuro Motor/Sensory: NL/NL  Vascular: NL foot/ankle pulses,   Lymphatics: No extremity lymphedema, No calf swelling, no tenderness to calf muscles.    RADIOGRAPHS// IMAGING//DIAGNOSTIC DATA     Orders Placed This Encounter   Procedures    [61231] Foot Min 3V     left      FOOT X RAYS 3 VIEWS LEFT  3/27/2024    LEFT FOOT X-rays, NON WEIGHT BEARING 3 views, AP/LAT/OBL completed 3/27/2024 AT [facility]     X-rays reveal Osseous: No fractures or dislocations. No focal osteolytic or osteoblastic process. Bone Spurs: No significant bone spurs. Overall alignment is

## 2024-04-02 RX ORDER — AMLODIPINE BESYLATE 5 MG/1
TABLET ORAL
Qty: 90 TABLET | Refills: 3 | Status: SHIPPED | OUTPATIENT
Start: 2024-04-02

## 2024-05-08 ENCOUNTER — OFFICE VISIT (OUTPATIENT)
Age: 69
End: 2024-05-08
Payer: MEDICARE

## 2024-05-08 DIAGNOSIS — M19.072 PRIMARY OSTEOARTHRITIS OF LEFT FOOT: ICD-10-CM

## 2024-05-08 DIAGNOSIS — M76.62 ACHILLES TENDINITIS OF LEFT LOWER EXTREMITY: Primary | ICD-10-CM

## 2024-05-08 PROCEDURE — 3017F COLORECTAL CA SCREEN DOC REV: CPT | Performed by: ORTHOPAEDIC SURGERY

## 2024-05-08 PROCEDURE — 4004F PT TOBACCO SCREEN RCVD TLK: CPT | Performed by: ORTHOPAEDIC SURGERY

## 2024-05-08 PROCEDURE — 99214 OFFICE O/P EST MOD 30 MIN: CPT | Performed by: ORTHOPAEDIC SURGERY

## 2024-05-08 PROCEDURE — 1123F ACP DISCUSS/DSCN MKR DOCD: CPT | Performed by: ORTHOPAEDIC SURGERY

## 2024-05-08 PROCEDURE — G8427 DOCREV CUR MEDS BY ELIG CLIN: HCPCS | Performed by: ORTHOPAEDIC SURGERY

## 2024-05-08 PROCEDURE — G8421 BMI NOT CALCULATED: HCPCS | Performed by: ORTHOPAEDIC SURGERY

## 2024-05-08 NOTE — PROGRESS NOTES
have spent 30 minutes reviewing the previous notes, reviewing diagnostic studies [Advanced  Imaging, Diagnostic test results (x-rays)] and had a direct face to face with the patient discussing the diagnosis and importance of compliance with the treatment and plan.  The treatment plan is listed in the above plan section of this Office encounter. I  answered all of his questions, as well as documenting patient care coordination for this individual on the day of the visit.      Disclaimer:     Sections of this note are dictated using utilizing voice recognition software, which may have resulted in some phonetic based errors in grammar and contents. Even though attempts were made to correct all the mistakes, some may have been missed, and remained in the body of the document. If questions arise, please contact our department.     An electronic signature was used to authenticate this note.    Robles Casas may have a reminder for a \"due or due soon\" health maintenance. I have asked that he contact his primary care provider for follow-up on this health maintenance.         Documentation by boom Panda, as dictated by Peterson Kay MD on 5/8/2024.

## 2024-05-09 ENCOUNTER — OFFICE VISIT (OUTPATIENT)
Age: 69
End: 2024-05-09
Payer: MEDICARE

## 2024-05-09 VITALS
SYSTOLIC BLOOD PRESSURE: 144 MMHG | OXYGEN SATURATION: 97 % | BODY MASS INDEX: 33.59 KG/M2 | HEIGHT: 66 IN | WEIGHT: 209 LBS | HEART RATE: 99 BPM | DIASTOLIC BLOOD PRESSURE: 98 MMHG

## 2024-05-09 DIAGNOSIS — R07.9 CHEST PAIN, UNSPECIFIED TYPE: ICD-10-CM

## 2024-05-09 DIAGNOSIS — R00.0 TACHYCARDIA, UNSPECIFIED: ICD-10-CM

## 2024-05-09 DIAGNOSIS — I10 ESSENTIAL (PRIMARY) HYPERTENSION: Primary | ICD-10-CM

## 2024-05-09 PROCEDURE — 4004F PT TOBACCO SCREEN RCVD TLK: CPT | Performed by: INTERNAL MEDICINE

## 2024-05-09 PROCEDURE — G8417 CALC BMI ABV UP PARAM F/U: HCPCS | Performed by: INTERNAL MEDICINE

## 2024-05-09 PROCEDURE — 3017F COLORECTAL CA SCREEN DOC REV: CPT | Performed by: INTERNAL MEDICINE

## 2024-05-09 PROCEDURE — 3077F SYST BP >= 140 MM HG: CPT | Performed by: INTERNAL MEDICINE

## 2024-05-09 PROCEDURE — 1123F ACP DISCUSS/DSCN MKR DOCD: CPT | Performed by: INTERNAL MEDICINE

## 2024-05-09 PROCEDURE — G8427 DOCREV CUR MEDS BY ELIG CLIN: HCPCS | Performed by: INTERNAL MEDICINE

## 2024-05-09 PROCEDURE — 99214 OFFICE O/P EST MOD 30 MIN: CPT | Performed by: INTERNAL MEDICINE

## 2024-05-09 PROCEDURE — 3080F DIAST BP >= 90 MM HG: CPT | Performed by: INTERNAL MEDICINE

## 2024-05-09 PROCEDURE — 93000 ELECTROCARDIOGRAM COMPLETE: CPT | Performed by: INTERNAL MEDICINE

## 2024-05-09 ASSESSMENT — PATIENT HEALTH QUESTIONNAIRE - PHQ9
1. LITTLE INTEREST OR PLEASURE IN DOING THINGS: NOT AT ALL
SUM OF ALL RESPONSES TO PHQ QUESTIONS 1-9: 0
SUM OF ALL RESPONSES TO PHQ9 QUESTIONS 1 & 2: 0
SUM OF ALL RESPONSES TO PHQ QUESTIONS 1-9: 0
SUM OF ALL RESPONSES TO PHQ QUESTIONS 1-9: 0
2. FEELING DOWN, DEPRESSED OR HOPELESS: NOT AT ALL
SUM OF ALL RESPONSES TO PHQ QUESTIONS 1-9: 0

## 2024-05-09 NOTE — PROGRESS NOTES
Robles Casas    HTN, tachycardia    HPI    Robles Casas is a 67 y.o. AAM with no known cardiac disease here for initial cardiac evaluation of hypertension tachycardia.  As you know patient had an episode of diverticulitis and he says he went to the hospital and was told his blood pressure was high.  He was started on hydrochlorothiazide and said he did not feel well on that medicine and he believed it was causing him more issues so that was stopped and he was then put on amlodipine.  He says at home now his blood pressures are fine overall is feeling better but his heart rate is still fast.  Not really complaining of chest pain palpitations but did say that he can feel \"gas\" almost daily in his chest but that it goes away when he belches.    Beyond blood work and EKGs he has not had any further cardiac testing.  I personally obtained and reviewed these EKGs we compare them together today they really are just sinus tachycardia with somewhat poor R wave progression.  There are Q waves in 3 and aVF but this also looks like a lead placement issue as the prior one did not have that.      Past Medical History:   Diagnosis Date    High cholesterol        History reviewed. No pertinent surgical history.    Current Outpatient Medications   Medication Sig Dispense Refill    amLODIPine (NORVASC) 5 mg tablet Take 1 Tablet by mouth daily. 30 Tablet 5       Allergies   Allergen Reactions    Aspirin Unknown (comments)    Morphine Hives    Nsaids (Non-Steroidal Anti-Inflammatory Drug) Hives    Pepto-Bismol [Bismuth Subsalicylate] Hives    Prednisone Nausea Only    Vicodin [Hydrocodone-Acetaminophen] Unknown (comments)       Social History     Socioeconomic History    Marital status:      Spouse name: Not on file    Number of children: Not on file    Years of education: Not on file    Highest education level: Not on file   Occupational History    Not on file   Tobacco Use    Smoking status: Current Every Day Smoker

## 2025-03-17 RX ORDER — AMLODIPINE BESYLATE 5 MG/1
5 TABLET ORAL DAILY
Qty: 90 TABLET | Refills: 3 | Status: SHIPPED | OUTPATIENT
Start: 2025-03-17

## 2025-05-08 ENCOUNTER — OFFICE VISIT (OUTPATIENT)
Age: 70
End: 2025-05-08
Payer: MEDICARE

## 2025-05-08 VITALS
WEIGHT: 211 LBS | DIASTOLIC BLOOD PRESSURE: 88 MMHG | OXYGEN SATURATION: 96 % | HEART RATE: 101 BPM | BODY MASS INDEX: 33.91 KG/M2 | HEIGHT: 66 IN | SYSTOLIC BLOOD PRESSURE: 138 MMHG

## 2025-05-08 DIAGNOSIS — R07.9 CHEST PAIN, UNSPECIFIED TYPE: ICD-10-CM

## 2025-05-08 DIAGNOSIS — I10 ESSENTIAL (PRIMARY) HYPERTENSION: ICD-10-CM

## 2025-05-08 DIAGNOSIS — R00.0 TACHYCARDIA, UNSPECIFIED: Primary | ICD-10-CM

## 2025-05-08 PROCEDURE — 1123F ACP DISCUSS/DSCN MKR DOCD: CPT | Performed by: INTERNAL MEDICINE

## 2025-05-08 PROCEDURE — 99214 OFFICE O/P EST MOD 30 MIN: CPT | Performed by: INTERNAL MEDICINE

## 2025-05-08 PROCEDURE — 1126F AMNT PAIN NOTED NONE PRSNT: CPT | Performed by: INTERNAL MEDICINE

## 2025-05-08 PROCEDURE — 3079F DIAST BP 80-89 MM HG: CPT | Performed by: INTERNAL MEDICINE

## 2025-05-08 PROCEDURE — 3075F SYST BP GE 130 - 139MM HG: CPT | Performed by: INTERNAL MEDICINE

## 2025-05-08 PROCEDURE — 93000 ELECTROCARDIOGRAM COMPLETE: CPT | Performed by: INTERNAL MEDICINE

## 2025-05-08 ASSESSMENT — ANXIETY QUESTIONNAIRES
1. FEELING NERVOUS, ANXIOUS, OR ON EDGE: NOT AT ALL
3. WORRYING TOO MUCH ABOUT DIFFERENT THINGS: NOT AT ALL
6. BECOMING EASILY ANNOYED OR IRRITABLE: NOT AT ALL
IF YOU CHECKED OFF ANY PROBLEMS ON THIS QUESTIONNAIRE, HOW DIFFICULT HAVE THESE PROBLEMS MADE IT FOR YOU TO DO YOUR WORK, TAKE CARE OF THINGS AT HOME, OR GET ALONG WITH OTHER PEOPLE: NOT DIFFICULT AT ALL
GAD7 TOTAL SCORE: 0
5. BEING SO RESTLESS THAT IT IS HARD TO SIT STILL: NOT AT ALL
4. TROUBLE RELAXING: NOT AT ALL
7. FEELING AFRAID AS IF SOMETHING AWFUL MIGHT HAPPEN: NOT AT ALL
2. NOT BEING ABLE TO STOP OR CONTROL WORRYING: NOT AT ALL

## 2025-05-08 ASSESSMENT — PATIENT HEALTH QUESTIONNAIRE - PHQ9
1. LITTLE INTEREST OR PLEASURE IN DOING THINGS: NOT AT ALL
SUM OF ALL RESPONSES TO PHQ QUESTIONS 1-9: 0
2. FEELING DOWN, DEPRESSED OR HOPELESS: NOT AT ALL
SUM OF ALL RESPONSES TO PHQ QUESTIONS 1-9: 0

## 2025-05-08 NOTE — PROGRESS NOTES
Robles Casas presents today for No chief complaint on file.      Robles Casas preferred language for health care discussion is english/other.    Is someone accompanying this pt? no    Is the patient using any DME equipment during OV? no    Depression Screening:  Depression: Not at risk (5/9/2024)    PHQ-2     PHQ-2 Score: 0        Learning Assessment:  No question data found.       Pt currently taking Anticoagulant therapy? no    Pt currently taking Antiplatelet therapy ? no      Coordination of Care:  1. Have you been to the ER, urgent care clinic since your last visit? Hospitalized since your last visit? no    2. Have you seen or consulted any other health care providers outside of the [facility] since your last visit? Include any pap smears or colon screening. no    
Mother     No Known Problems Father     No Known Problems Sister     No Known Problems Brother     No Known Problems Maternal Aunt     No Known Problems Maternal Uncle     No Known Problems Paternal Aunt     No Known Problems Paternal Uncle     No Known Problems Maternal Grandmother     No Known Problems Maternal Grandfather     No Known Problems Paternal Grandmother     No Known Problems Paternal Grandfather     No Known Problems Other         Review of Systems    14 pt Review of Systems is negative unless otherwise mentioned in the HPI.    Wt Readings from Last 3 Encounters:   05/08/25 95.7 kg (211 lb)   05/09/24 94.8 kg (209 lb)   05/04/23 93 kg (205 lb)     Temp Readings from Last 3 Encounters:   No data found for Temp     BP Readings from Last 3 Encounters:   05/08/25 138/88   05/09/24 (!) 144/98   05/04/23 (!) 162/96     Pulse Readings from Last 3 Encounters:   05/08/25 (!) 101   05/09/24 99   05/04/23 (!) 102       Physical Exam:    /88 (BP Site: Left Upper Arm, Patient Position: Sitting, BP Cuff Size: Large Adult)   Pulse (!) 101   Ht 1.676 m (5' 6\")   Wt 95.7 kg (211 lb)   SpO2 96%   BMI 34.06 kg/m²    Physical Exam  Vitals and nursing note reviewed.   Constitutional:       General: He is not in acute distress.     Appearance: Normal appearance.   HENT:      Head: Normocephalic.      Nose: Nose normal.      Mouth/Throat:      Mouth: Mucous membranes are moist.   Eyes:      Extraocular Movements: Extraocular movements intact.      Pupils: Pupils are equal, round, and reactive to light.   Neck:      Vascular: No carotid bruit.   Cardiovascular:      Rate and Rhythm: Normal rate and regular rhythm.      Pulses: Normal pulses.      Heart sounds: No murmur heard.     No friction rub. No gallop.   Pulmonary:      Effort: Pulmonary effort is normal.      Breath sounds: Normal breath sounds. No wheezing or rales.   Abdominal:      Palpations: Abdomen is soft.   Musculoskeletal:      Cervical back: Neck

## 2025-06-24 ENCOUNTER — TELEPHONE (OUTPATIENT)
Age: 70
End: 2025-06-24

## 2025-06-24 NOTE — TELEPHONE ENCOUNTER
Left initial voicemail to schedule appointment as referred for right shoulder pain. Patient is not MyChart active. [employer] message not sent. Referral scanned in chart.

## 2025-06-26 ENCOUNTER — OFFICE VISIT (OUTPATIENT)
Age: 70
End: 2025-06-26

## 2025-06-26 VITALS — BODY MASS INDEX: 34.55 KG/M2 | WEIGHT: 215 LBS | HEIGHT: 66 IN

## 2025-06-26 DIAGNOSIS — M50.30 DDD (DEGENERATIVE DISC DISEASE), CERVICAL: ICD-10-CM

## 2025-06-26 DIAGNOSIS — M25.511 ACUTE PAIN OF RIGHT SHOULDER: Primary | ICD-10-CM

## 2025-06-26 DIAGNOSIS — M54.12 CERVICAL RADICULOPATHY: ICD-10-CM

## 2025-06-26 DIAGNOSIS — M75.101 NONTRAUMATIC TEAR OF RIGHT ROTATOR CUFF, UNSPECIFIED TEAR EXTENT: ICD-10-CM

## 2025-06-26 RX ORDER — CYCLOBENZAPRINE HCL 10 MG
10 TABLET ORAL NIGHTLY
Qty: 30 TABLET | Refills: 0 | Status: SHIPPED | OUTPATIENT
Start: 2025-06-26 | End: 2025-07-26

## 2025-08-18 ENCOUNTER — OFFICE VISIT (OUTPATIENT)
Age: 70
End: 2025-08-18
Payer: MEDICARE

## 2025-08-18 VITALS — HEIGHT: 66 IN | WEIGHT: 211 LBS | BODY MASS INDEX: 33.91 KG/M2

## 2025-08-18 DIAGNOSIS — M70.21 OLECRANON BURSITIS OF RIGHT ELBOW: Primary | ICD-10-CM

## 2025-08-18 PROCEDURE — 73080 X-RAY EXAM OF ELBOW: CPT | Performed by: ORTHOPAEDIC SURGERY

## 2025-08-18 PROCEDURE — 1123F ACP DISCUSS/DSCN MKR DOCD: CPT | Performed by: ORTHOPAEDIC SURGERY

## 2025-08-18 PROCEDURE — 99213 OFFICE O/P EST LOW 20 MIN: CPT | Performed by: ORTHOPAEDIC SURGERY

## 2025-08-18 PROCEDURE — 1126F AMNT PAIN NOTED NONE PRSNT: CPT | Performed by: ORTHOPAEDIC SURGERY

## 2025-08-18 PROCEDURE — 1160F RVW MEDS BY RX/DR IN RCRD: CPT | Performed by: ORTHOPAEDIC SURGERY

## 2025-08-18 PROCEDURE — 1159F MED LIST DOCD IN RCRD: CPT | Performed by: ORTHOPAEDIC SURGERY
